# Patient Record
Sex: MALE | Race: WHITE | NOT HISPANIC OR LATINO | Employment: UNEMPLOYED | URBAN - METROPOLITAN AREA
[De-identification: names, ages, dates, MRNs, and addresses within clinical notes are randomized per-mention and may not be internally consistent; named-entity substitution may affect disease eponyms.]

---

## 2023-09-25 ENCOUNTER — HOSPITAL ENCOUNTER (EMERGENCY)
Facility: CLINIC | Age: 33
Discharge: HOME | End: 2023-09-25
Attending: EMERGENCY MEDICINE | Admitting: EMERGENCY MEDICINE

## 2023-09-25 VITALS
WEIGHT: 162 LBS | RESPIRATION RATE: 16 BRPM | DIASTOLIC BLOOD PRESSURE: 70 MMHG | OXYGEN SATURATION: 98 % | HEART RATE: 63 BPM | SYSTOLIC BLOOD PRESSURE: 128 MMHG | TEMPERATURE: 98.4 F
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
## 2023-09-25 DIAGNOSIS — Z20.3 NEED FOR POST EXPOSURE PROPHYLAXIS FOR RABIES: ICD-10-CM

## 2023-09-25 PROCEDURE — 250N000011 HC RX IP 250 OP 636: Performed by: EMERGENCY MEDICINE

## 2023-09-25 PROCEDURE — 12001 RPR S/N/AX/GEN/TRNK 2.5CM/<: CPT

## 2023-09-25 PROCEDURE — 90471 IMMUNIZATION ADMIN: CPT | Performed by: EMERGENCY MEDICINE

## 2023-09-25 PROCEDURE — 96372 THER/PROPH/DIAG INJ SC/IM: CPT | Performed by: EMERGENCY MEDICINE

## 2023-09-25 PROCEDURE — 90675 RABIES VACCINE IM: CPT | Performed by: EMERGENCY MEDICINE

## 2023-09-25 PROCEDURE — 99284 EMERGENCY DEPT VISIT MOD MDM: CPT

## 2023-09-25 PROCEDURE — 90375 RABIES IG IM/SC: CPT | Performed by: EMERGENCY MEDICINE

## 2023-09-25 PROCEDURE — 250N000013 HC RX MED GY IP 250 OP 250 PS 637: Performed by: EMERGENCY MEDICINE

## 2023-09-25 RX ADMIN — Medication 1 ML: at 19:19

## 2023-09-25 RX ADMIN — AMOXICILLIN AND CLAVULANATE POTASSIUM 1 TABLET: 875; 125 TABLET, FILM COATED ORAL at 19:26

## 2023-09-25 RX ADMIN — RABIES IMMUNE GLOBULIN (HUMAN) 1470 UNITS: 300 INJECTION, SOLUTION INFILTRATION; INTRAMUSCULAR at 19:24

## 2023-09-25 ASSESSMENT — ACTIVITIES OF DAILY LIVING (ADL)
ADLS_ACUITY_SCORE: 35
ADLS_ACUITY_SCORE: 35

## 2023-09-25 NOTE — DISCHARGE INSTRUCTIONS
Please return to the the ED if you notice increasing redness, warmth, swelling, drainage or fevers, this is concerning for infection.  Keep wound clean and dry, wash after the first 24 hours with warm soapy water.      Have suture removed in 5-7 days    Rabies vaccines will be required on days 3 (9/28), 7 and 14 after initial in the ER visit (today is day 0).  Use the card to arrange follow up

## 2023-09-25 NOTE — ED PROVIDER NOTES
History     Chief Complaint:  Dog Bite       HPI   Jamari Bradshaw is a 33 year old male who presents with a dog bite.  Patient reports that he was at the dog park with his dog when he tried to pry a dog that had attacked his dog off his dog.  He forgot to ask about the dog's vaccination status as he was too flustered by the events.  He is up-to-date on his vaccinations.  His dog is otherwise happy and healthy.  He denies any other injuries.      Independent Historian:    none    Review of External Notes:  Reviewed patient's paper chart from his recent immigration including his tetanus shot.    Medications:    No current outpatient medications on file.      Past Medical History:    No past medical history on file.    Past Surgical History:    No past surgical history on file.       Physical Exam   Patient Vitals for the past 24 hrs:   BP Temp Temp src Pulse Resp SpO2   09/25/23 1449 128/70 98.4  F (36.9  C) Oral 63 16 98 %        Physical Exam  General: Resting on the bed.  Head: No obvious trauma to head.  Ears, Nose, Throat:  External ears normal.  Nose normal.    Eyes:  Conjunctivae clear.    CV: Regular rate and rhythm.  No murmurs.      Respiratory: Effort normal and breath sounds normal.  No wheezing or crackles.   Gastrointestinal: Soft.  No distension. There is no tenderness.    Musculoskeletal: Normal range of motion.  Non tender extremities to palpations.  Full range of motion.  Sensation tact to light touch.  Normal cap refill.  Neuro: Alert. Moving all extremities appropriately.  Normal speech.    Skin: Skin is warm and dry. 1 cm laceration on the palmar aspect of the L 3 rd finger     Emergency Department Course       Laboratory:  Labs Ordered and Resulted from Time of ED Arrival to Time of ED Departure - No data to display     [facility]    -Laceration Repair    Date/Time: 9/25/2023 8:32 PM    Performed by: Alicia Diaz MD  Authorized by: Alicia Diaz MD     Risks, benefits and alternatives discussed.      ANESTHESIA (see MAR for exact dosages):     Anesthesia method:  Nerve block    Block location:  Digital block 4 th finger    Block needle gauge:  30 G    Block anesthetic:  Lidocaine 1% w/o epi    Block injection procedure:  Anatomic landmarks identified, anatomic landmarks palpated and negative aspiration for blood    Block outcome:  Anesthesia achieved    LACERATION DETAILS     Location:  Finger    Finger location:  L long finger    Length (cm):  1    REPAIR TYPE:     Repair type:  Simple    EXPLORATION:     Hemostasis achieved with:  Direct pressure    Wound exploration: wound explored through full range of motion      Contaminated: no      TREATMENT:     Area cleansed with:  Shur-Clens and saline    Amount of cleaning:  Standard    SKIN REPAIR     Repair method:  Sutures    Suture size:  5-0    Suture material:  Prolene    Number of sutures:  1    APPROXIMATION     Approximation:  Close    POST-PROCEDURE DETAILS     Dressing:  Antibiotic ointment and adhesive bandage      PROCEDURE    Patient Tolerance:  Patient tolerated the procedure well with no immediate complications         Emergency Department Course & Assessments:             Interventions:  Medications - No data to display     Assessments:  1710 I met with patient, obtained history and performed examination.      Independent Interpretation (X-rays, CTs, rhythm strip):  None    Consultations/Discussion of Management or Tests:  ED Course as of 09/25/23 2030   Mon Sep 25, 2023   1805 I spoke with MD regarding vaccination.  Likely low risk for rabies but given we can not observe the dog for 10 days they recommend vaccination.         Social Determinants of Health affecting care:  none     Disposition:  The patient was discharged to home.     Impression & Plan        Medical Decision Makin-year-old male otherwise healthy presents to the ER with a finger laceration.  Vitals reassuring.  Broad  differentials pursued including but not limited to fracture, retained foreign body, neurovascular injury, laceration, contusion, nail injury, etc.  Overall patient's well-appearing nontoxic.  He has full range of motion.  He is neurovascular intact distal to the ecchymosis and laceration.  No bony tenderness palpation.  Full range of motion.  He does not have any retained foreign body sensation.  Able to visualize the wound and do not see any retained foreign body.  Patient does not think x-rays indicated nothing this is reasonable given there is no bony tenderness indicate fracture, dislocation or other injury.  Patient does have a subungual hematoma but there is a puncture wound and it is already draining.  Does not need to be drained.  1 suture was placed into the laceration to approximate the tissue.  We discussed leaving the wound somewhat open given the dog bite nature.  Augmentin was initiated in the ER as well as sent to the pharmacy of his drug.  I discussed rabies risk with [facility] and they recommend rabies immunoglobulin and vaccination.  This was started in the ER.  Patient is given the follow-up card for outpatient management.  Return precautions were discussed.  Patient discharged home.      Diagnosis:    ICD-10-CM    1. Dog bite, initial encounter  W54.0XXA            Discharge Medications:  New Prescriptions    No medications on file          9/25/2023   Alicia Diaz MD Bennett, Jennifer L, MD  09/25/23 2036

## 2023-09-25 NOTE — ED TRIAGE NOTES
Pt sts around 1030 this am he was bit by a dog and he does not know vaccination status. Has lac on finger     Triage Assessment       Row Name 09/25/23 0460       Triage Assessment (Adult)    Airway WDL WDL       Respiratory WDL    Respiratory WDL WDL       Skin Circulation/Temperature WDL    Skin Circulation/Temperature WDL WDL       Cardiac WDL    Cardiac WDL WDL       Peripheral/Neurovascular WDL    Peripheral Neurovascular WDL WDL       Cognitive/Neuro/Behavioral WDL    Cognitive/Neuro/Behavioral WDL WDL

## 2023-09-28 ENCOUNTER — ALLIED HEALTH/NURSE VISIT (OUTPATIENT)
Dept: FAMILY MEDICINE | Facility: CLINIC | Age: 33
End: 2023-09-28

## 2023-09-28 DIAGNOSIS — Z20.3 RABIES EXPOSURE: Primary | ICD-10-CM

## 2023-09-28 PROCEDURE — 90471 IMMUNIZATION ADMIN: CPT

## 2023-09-28 PROCEDURE — 99207 PR NO CHARGE NURSE ONLY: CPT

## 2023-09-28 PROCEDURE — 90675 RABIES VACCINE IM: CPT

## 2023-09-28 NOTE — PROGRESS NOTES
Patient Name: Jamari Bradshaw  Ordering Provider: Renetta Vinson NP  Doses Given: 2   Doses Outstandin  Date Seen in ED: 2023  Date Follow-Up Needed: 10/2/2023, 10/9/2023    MIGUEL NOLAN RN on 2023 at 10:18 AM

## 2023-10-02 ENCOUNTER — ALLIED HEALTH/NURSE VISIT (OUTPATIENT)
Dept: FAMILY MEDICINE | Facility: CLINIC | Age: 33
End: 2023-10-02

## 2023-10-02 DIAGNOSIS — Z20.3 RABIES EXPOSURE: Primary | ICD-10-CM

## 2023-10-02 PROCEDURE — 99207 PR NO CHARGE NURSE ONLY: CPT

## 2023-10-02 PROCEDURE — 90675 RABIES VACCINE IM: CPT

## 2023-10-02 PROCEDURE — 90471 IMMUNIZATION ADMIN: CPT

## 2023-10-02 NOTE — PROGRESS NOTES
Patient Name: Jamari Bradshaw  Ordering Provider: Dr. Beasley  Doses Given: 3  Doses Outstandin  Date Seen in ED: 2023  Date Follow-Up Needed: 10/9/2023      MIGUEL NOLAN RN on 10/2/2023 at 10:13 AM

## 2023-10-09 ENCOUNTER — ALLIED HEALTH/NURSE VISIT (OUTPATIENT)
Dept: FAMILY MEDICINE | Facility: CLINIC | Age: 33
End: 2023-10-09

## 2023-10-09 DIAGNOSIS — Z20.3 RABIES EXPOSURE: Primary | ICD-10-CM

## 2023-10-09 PROCEDURE — 90471 IMMUNIZATION ADMIN: CPT

## 2023-10-09 PROCEDURE — 99207 PR NO CHARGE NURSE ONLY: CPT

## 2023-10-09 PROCEDURE — 90675 RABIES VACCINE IM: CPT

## 2023-10-09 NOTE — PROGRESS NOTES
Patient Name: Jamari Bradshaw  Ordering Provider: Joss Beasley MD  Doses Given: 4  Doses Outstandin  Date Seen in ED: 2023  Date Follow-Up Needed: n/a      MIGUEL NOLAN RN on 10/9/2023 at 10:21 AM